# Patient Record
Sex: MALE | Race: WHITE | URBAN - METROPOLITAN AREA
[De-identification: names, ages, dates, MRNs, and addresses within clinical notes are randomized per-mention and may not be internally consistent; named-entity substitution may affect disease eponyms.]

---

## 2020-12-15 ENCOUNTER — OFFICE VISIT (OUTPATIENT)
Dept: ORTHOPEDIC SURGERY | Age: 35
End: 2020-12-15
Payer: COMMERCIAL

## 2020-12-15 VITALS — TEMPERATURE: 98.4 F | HEIGHT: 70 IN | WEIGHT: 216 LBS | BODY MASS INDEX: 30.92 KG/M2

## 2020-12-15 PROCEDURE — L1902 AFO ANKLE GAUNTLET PRE OTS: HCPCS | Performed by: ORTHOPAEDIC SURGERY

## 2020-12-15 PROCEDURE — 99203 OFFICE O/P NEW LOW 30 MIN: CPT | Performed by: ORTHOPAEDIC SURGERY

## 2020-12-15 RX ORDER — DEXTROAMPHETAMINE SACCHARATE, AMPHETAMINE ASPARTATE MONOHYDRATE, DEXTROAMPHETAMINE SULFATE AND AMPHETAMINE SULFATE 2.5; 2.5; 2.5; 2.5 MG/1; MG/1; MG/1; MG/1
10 CAPSULE, EXTENDED RELEASE ORAL EVERY MORNING
COMMUNITY

## 2020-12-15 NOTE — PROGRESS NOTES
CHIEF COMPLAINT: Left posterior-lateral ankle pain/peroneal tendenosis. HISTORY:  Mr. Song Steen 37 y.o.  male presents today for the first visit for evaluation of left posterior-lateral ankle pain which started 1 month ago with no new injury.  He was self-referred. He is complaining of achy tender pain with grinding cracking sound in the lateral ankle. Rates pain a 3/10 VAS. Pain is intermittent and waxes and wanes. Sometimes the pain is sharp and sometimes he does not have any pain at all. Pain is increase with standing and walking, and decrease with rest.  Pain is sharp with first few steps, dull achy pain by the end of the day. The pain radiates to lateral leg, and no numbness and tingling sensation. No other complaint. He states he has had many ankle sprains while in high school. Denies smoking. He has a sitting job doing sales. History reviewed. No pertinent past medical history. History reviewed. No pertinent surgical history.     Social History     Socioeconomic History    Marital status:      Spouse name: Not on file    Number of children: Not on file    Years of education: Not on file    Highest education level: Not on file   Occupational History    Not on file   Social Needs    Financial resource strain: Not on file    Food insecurity     Worry: Not on file     Inability: Not on file    Transportation needs     Medical: Not on file     Non-medical: Not on file   Tobacco Use    Smoking status: Never Smoker    Smokeless tobacco: Never Used   Substance and Sexual Activity    Alcohol use: Yes     Comment: occas    Drug use: No    Sexual activity: Not on file   Lifestyle    Physical activity     Days per week: Not on file     Minutes per session: Not on file    Stress: Not on file   Relationships    Social connections     Talks on phone: Not on file     Gets together: Not on file     Attends [employer] service: Not on file Active member of club or organization: Not on file     Attends meetings of clubs or organizations: Not on file     Relationship status: Not on file    Intimate partner violence     Fear of current or ex partner: Not on file     Emotionally abused: Not on file     Physically abused: Not on file     Forced sexual activity: Not on file   Other Topics Concern    Not on file   Social History Narrative    Not on file       History reviewed. No pertinent family history. Current Outpatient Medications on File Prior to Visit   Medication Sig Dispense Refill    amphetamine-dextroamphetamine (ADDERALL XR) 10 MG extended release capsule Take 10 mg by mouth every morning.  Aspirin-Acetaminophen-Caffeine (EXCEDRIN PO) Take  by mouth.  GuaiFENesin (MUCINEX PO) Take  by mouth.  Pseudoephedrine-APAP-DM (DAYQUIL PO) Take  by mouth.  Acetaminophen (TYLENOL PO) Take  by mouth. No current facility-administered medications on file prior to visit. Pertinent items are noted in HPI  Review of systems reviewed from Patient History Form dated on 12/15/2020 and available in the patient's chart under the Media tab. No change noted. PHYSICAL EXAMINATION:  Mr. Ysabel Guthrie is a very pleasant 37 y.o.  male who presents today in no acute distress, awake, alert, and oriented. He is well dressed, nourished and  groomed. Patient with normal affect. Height is  5' 10\" (1.778 m), weight is 216 lb (98 kg), Body mass index is 30.99 kg/m². Resting respiratory rate is 16. Examination of the gait, showed that the patient walks heel-toe with a minimal limp.  Examination of both ankles showing dorsiflexion to about 10 degrees bilaterally, which increased with knee flexion. He has intact sensation and good pedal pulses.  He has weak eversion, and has moderate tenderness on deep palpation over the peroneal tendon with swelling compared to the other side.  There is mild subluxation in the left peroneal tendons compared to the other side. The ankles are stable to drawer test bilaterally, equally.       IMAGING: Xray's were reviewed.  3 views of the left ankle taken in office today, and showed no acute fracture. There is evidence of an old fracture in the right medial malleolus. IMPRESSION: Left peroneal tendenosis. PLAN: I discussed with the patient all the treatment options, and natural history of peroneal tendenitis. We recommended stretching exercises of the calf which was taught to the patient today. He will take NSAIDS Naprosyn. I believe he will benefit from ankle brace, and that was applied in the office today and instructed him in care. We discussed the risk of progression. We will see him  back in 6 weeks and may consider PT if needed. Procedures    Aircast Airlift PTTD Ankle Brace     Patient was prescribed an Aircast Airlift PTTD Brace. The left ankle will require stabilization / immobilization from this semi-rigid / rigid orthosis to improve their function. The orthosis will assist in protecting the affected area, provide functional support and facilitate healing. Patient was instructed to progress ambulation weight bearing as tolerated in the device. The patient was educated and fit by a healthcare professional with expert knowledge and specialization in brace application while under the direct supervision of the treating physician. Verbal and written instructions for the use of and application of this item were provided. They were instructed to contact the office immediately should the brace result in increased pain, decreased sensation, increased swelling or worsening of the condition.      Saida Robledo MD

## 2020-12-16 PROBLEM — M76.72 PERONEAL TENDINITIS, LEFT: Status: ACTIVE | Noted: 2020-12-16

## 2020-12-16 RX ORDER — NAPROXEN 500 MG/1
500 TABLET ORAL 2 TIMES DAILY WITH MEALS
Qty: 60 TABLET | Refills: 0 | Status: SHIPPED | OUTPATIENT
Start: 2020-12-16 | End: 2021-01-15